# Patient Record
Sex: MALE | Race: WHITE | URBAN - METROPOLITAN AREA
[De-identification: names, ages, dates, MRNs, and addresses within clinical notes are randomized per-mention and may not be internally consistent; named-entity substitution may affect disease eponyms.]

---

## 2017-08-25 ENCOUNTER — OFFICE VISIT (OUTPATIENT)
Dept: FAMILY MEDICINE | Facility: OTHER | Age: 37
End: 2017-08-25
Payer: COMMERCIAL

## 2017-08-25 VITALS
HEIGHT: 69 IN | BODY MASS INDEX: 26.73 KG/M2 | WEIGHT: 180.5 LBS | DIASTOLIC BLOOD PRESSURE: 66 MMHG | TEMPERATURE: 97.8 F | HEART RATE: 64 BPM | RESPIRATION RATE: 16 BRPM | SYSTOLIC BLOOD PRESSURE: 126 MMHG

## 2017-08-25 DIAGNOSIS — L23.7 CONTACT DERMATITIS DUE TO POISON IVY: Primary | ICD-10-CM

## 2017-08-25 DIAGNOSIS — Z23 NEED FOR TDAP VACCINATION: ICD-10-CM

## 2017-08-25 PROCEDURE — 90471 IMMUNIZATION ADMIN: CPT | Performed by: NURSE PRACTITIONER

## 2017-08-25 PROCEDURE — 99213 OFFICE O/P EST LOW 20 MIN: CPT | Mod: 25 | Performed by: NURSE PRACTITIONER

## 2017-08-25 PROCEDURE — 90715 TDAP VACCINE 7 YRS/> IM: CPT | Performed by: NURSE PRACTITIONER

## 2017-08-25 RX ORDER — CLOBETASOL PROPIONATE 0.5 MG/G
CREAM TOPICAL
Qty: 45 G | Refills: 0 | Status: SHIPPED | OUTPATIENT
Start: 2017-08-25 | End: 2019-11-13

## 2017-08-25 RX ORDER — PREDNISONE 20 MG/1
TABLET ORAL
Qty: 15 TABLET | Refills: 0 | Status: SHIPPED | OUTPATIENT
Start: 2017-08-25 | End: 2019-11-13

## 2017-08-25 ASSESSMENT — PAIN SCALES - GENERAL: PAINLEVEL: NO PAIN (0)

## 2017-08-25 NOTE — MR AVS SNAPSHOT
After Visit Summary   8/25/2017    Parvez Courtney    MRN: [id number]           Patient Information     Date Of Birth          1980        Visit Information        Provider Department      8/25/2017 1:00 PM Anabell Ramesh NP [facility]        Today's Diagnoses     Contact dermatitis due to poison ivy    -  1    Need for Tdap vaccination          Care Instructions    Make sure to avoid the plants in the future  Wash all clothing and bedding you may have had contact with  Can use over the counter products for itch such as oatmeal baths, calmine lotion, zanfel  Could take benedryl 25-50mg at night to help you sleep  Can use the clobetasol ointment twice a day as needed for itch    Take the steroids as prescribed.  40mg a day for 3 days, then 30mg a day for 3 days, then 20mg a day for 3 days then 10mg a day for 3 days then stop.    Follow up if no improvement, fevers, signs of infection.    Follow up for physical when you have time          Follow-ups after your visit        Who to contact     If you have questions or need follow up information about today's clinic visit or your schedule please contact [facility] directly at [phone].  Normal or non-critical lab and imaging results will be communicated to you by MyChart, letter or phone within 4 business days after the clinic has received the results. If you do not hear from us within 7 days, please contact the clinic through MyChart or phone. If you have a critical or abnormal lab result, we will notify you by phone as soon as possible.  Submit refill requests through [employer] or call your pharmacy and they will forward the refill request to us. Please allow 3 business days for your refill to be completed.          Additional Information About Your Visit        MyChart Information     [employer] lets you send messages to your doctor, view your test results, renew your prescriptions, schedule appointments and more.  "To sign up, go to [email or web address] . Click on \"Log in\" on the left side of the screen, which will take you to the Welcome page. Then click on \"Sign up Now\" on the right side of the page.     You will be asked to enter the access code listed below, as well as some personal information. Please follow the directions to create your username and password.     Your access code is: [email or web address]  Expires: 2017  1:24 PM     Your access code will  in 90 days. If you need help or a new code, please call your [facility] clinic or [phone].        Care EveryWhere ID     This is your Care EveryWhere ID. This could be used by other organizations to access your [facility] medical records  [id number]        Your Vitals Were     Pulse Temperature Respirations Height BMI (Body Mass Index)       64 97.8  F (36.6  C) (Temporal) 16 5' 8.5\" (1.74 m) 27.04 kg/m2        Blood Pressure from Last 3 Encounters:   17 126/66   16 100/64   03/15/16 108/62    Weight from Last 3 Encounters:   17 180 lb 8 oz (81.9 kg)   16 175 lb (79.4 kg)   03/15/16 186 lb (84.4 kg)              We Performed the Following     TDAP VACCINE (ADACEL)          Today's Medication Changes          These changes are accurate as of: 17  1:24 PM.  If you have any questions, ask your nurse or doctor.               Start taking these medicines.        Dose/Directions    predniSONE 20 MG tablet   Commonly known as:  DELTASONE   Used for:  Contact dermatitis due to poison ivy   Started by:  Anabell Ramesh, NP        Take 2 tabs (40 mg) by mouth daily x 3 days, 1.5 tabs (30 mg) daily x 3 days, 1 tab (20 mg) daily x 3 days, then 1/2 tab (10 mg) x 3 days.   Quantity:  15 tablet   Refills:  0            Where to get your medicines      These medications were sent to [facility] - [address]  [address] [address]     Phone:  [phone]     predniSONE 20 MG tablet       "          Primary Care Provider Office Phone # Fax #    aDmir Hernandez [phone] [address] [place] [address]        Equal Access to Services     KOLEHIRA CYNTHIA : Bella joe carbajal franciso Soireneali, waaxda luqadaha, qaybta kaalmada idaliada, tonya rodrígueztory dyllan. So [facility] [phone].    ATENCIÓN: Si habla español, tiene a read disposición servicios gratuitos de asistencia lingüística. Llame al [phone].    We comply with applicable federal civil rights laws and [place] laws. We do not discriminate on the basis of race, color, national origin, age, disability sex, sexual orientation or gender identity.            Thank you!     Thank you for choosing [facility]  for your care. Our goal is always to provide you with excellent care. Hearing back from our patients is one way we can continue to improve our services. Please take a few minutes to complete the written survey that you may receive in the mail after your visit with us. Thank you!             Your Updated Medication List - Protect others around you: Learn how to safely use, store and throw away your medicines at www.disposemymeds.org.          This list is accurate as of: 8/25/17  1:24 PM.  Always use your most recent med list.                   Brand Name Dispense Instructions for use Diagnosis    predniSONE 20 MG tablet    DELTASONE    15 tablet    Take 2 tabs (40 mg) by mouth daily x 3 days, 1.5 tabs (30 mg) daily x 3 days, 1 tab (20 mg) daily x 3 days, then 1/2 tab (10 mg) x 3 days.    Contact dermatitis due to poison ivy

## 2017-08-25 NOTE — PATIENT INSTRUCTIONS
Make sure to avoid the plants in the future  Wash all clothing and bedding you may have had contact with  Can use over the counter products for itch such as oatmeal baths, calmine lotion, zanfel  Could take benedryl 25-50mg at night to help you sleep  Can use the clobetasol ointment twice a day as needed for itch    Take the steroids as prescribed.  40mg a day for 3 days, then 30mg a day for 3 days, then 20mg a day for 3 days then 10mg a day for 3 days then stop.    Follow up if no improvement, fevers, signs of infection.    Follow up for physical when you have time

## 2017-08-25 NOTE — NURSING NOTE
Prior to injection verified patient identity using patient's name and date of birth.  Screening Questionnaire for Adult Immunization    Are you sick today?   No   Do you have allergies to medications, food, a vaccine component or latex?   No   Have you ever had a serious reaction after receiving a vaccination?   No   Do you have a long-term health problem with heart disease, lung disease, asthma, kidney disease, metabolic disease (e.g. diabetes), anemia, or other blood disorder?   No   Do you have cancer, leukemia, HIV/AIDS, or any other immune system problem?   No   In the past 3 months, have you taken medications that affect  your immune system, such as prednisone, other steroids, or anticancer drugs; drugs for the treatment of rheumatoid arthritis, Crohn s disease, or psoriasis; or have you had radiation treatments?   No   Have you had a seizure, or a brain or other nervous system problem?   No   During the past year, have you received a transfusion of blood or blood     products, or been given immune (gamma) globulin or antiviral drug?   No   For women: Are you pregnant or is there a chance you could become        pregnant during the next month?   No   Have you received any vaccinations in the past 4 weeks?   No     Immunization questionnaire answers were all negative.        Per orders of Anabell Ramesh, injection of Tdap given by Crystal Solano. Patient instructed to remain in clinic for 15 minutes afterwards, and to report any adverse reaction to me immediately.       Screening performed by Crystal Solano on 8/25/2017 at 1:26 PM.

## 2017-08-25 NOTE — NURSING NOTE
"Chief Complaint   Patient presents with     Poison Ivy       Initial /66  Pulse 64  Temp 97.8  F (36.6  C) (Temporal)  Resp 16  Ht 5' 8.5\" (1.74 m)  Wt 180 lb 8 oz (81.9 kg)  BMI 27.04 kg/m2 Estimated body mass index is 27.04 kg/(m^2) as calculated from the following:    Height as of this encounter: 5' 8.5\" (1.74 m).    Weight as of this encounter: 180 lb 8 oz (81.9 kg).  Medication Reconciliation: complete    "

## 2017-08-25 NOTE — PROGRESS NOTES
SUBJECTIVE:   Parvez Courtney is a 36 year old male who presents to clinic today for the following health issues:    Rash  Onset: x 1 week    Description:   Location: Arms, and legs  Character: round, blotchy, raised, red  Itching (Pruritis): YES    Progression of Symptoms:  worsening    Accompanying Signs & Symptoms:  Fever: no   Body aches or joint pain: no   Sore throat symptoms: no   Recent cold symptoms: no     History:   Previous similar rash: YES    Precipitating factors:   Exposure to similar rash: no   New exposures: grasses   Recent travel: no     Alleviating factors:  no    Therapies Tried and outcome: Tecnu, poison ivy wash scrub, benadryl anti itch cream- nothing is helping    Gets poisen ivy often.  Has had third time this summer.  Put hand in it one time.  [place] has been trying to avoid the spots but hit a small patch again- has been spreading.      Problem list and histories reviewed & adjusted, as indicated.  Additional history: as documented    Patient Active Problem List   Diagnosis     Hyperlipidemia LDL goal <130     Umbilical hernia     Past Surgical History:   Procedure Laterality Date     HERNIORRHAPHY UMBILICAL  12/26/2013    Procedure: HERNIORRHAPHY UMBILICAL;  Open Umbilical hernia repair;  Surgeon: Jose Maria Rodriguez MD;  Location: [facility] OR     REPAIR TENDON BICEPS DISTAL UPPER EXTREMITY Right 6/15/2015    Procedure: REPAIR TENDON BICEPS DISTAL UPPER EXTREMITY;  Surgeon: Heron Lees DO;  Location: [facility] OR       Social History   Substance Use Topics     Smoking status: Former Smoker     Quit date: 1/26/2007     Smokeless tobacco: Current User     Types: Chew     Alcohol use No     Family History   Problem Relation Age of Onset     Other Cancer Brother 38     testicular     Other Cancer Paternal Grandfather      bladder     DIABETES No family hx of      Coronary Artery Disease No family hx of      Hypertension No family hx of       "Hyperlipidemia No family hx of      CEREBROVASCULAR DISEASE No family hx of      Breast Cancer No family hx of      Colon Cancer No family hx of      Prostate Cancer No family hx of          Current Outpatient Prescriptions   Medication Sig Dispense Refill     predniSONE (DELTASONE) 20 MG tablet Take 2 tabs (40 mg) by mouth daily x 3 days, 1.5 tabs (30 mg) daily x 3 days, 1 tab (20 mg) daily x 3 days, then 1/2 tab (10 mg) x 3 days. 15 tablet 0     clobetasol (TEMOVATE) 0.05 % cream Apply sparingly to affected area twice daily as needed.  Do not apply to face. 45 g 0     Allergies   Allergen Reactions     Nkda [No Known Drug Allergies]          Reviewed and updated as needed this visit by clinical staffTobacco  Allergies  Meds  Med Hx  Surg Hx  Fam Hx  Soc Hx      Reviewed and updated as needed this visit by Provider         ROS:  Constitutional, HEENT, cardiovascular, pulmonary, gi and gu systems are negative, except as otherwise noted.      OBJECTIVE:   /66  Pulse 64  Temp 97.8  F (36.6  C) (Temporal)  Resp 16  Ht 5' 8.5\" (1.74 m)  Wt 180 lb 8 oz (81.9 kg)  BMI 27.04 kg/m2  Body mass index is 27.04 kg/(m^2).   GENERAL: healthy, alert and no distress  MS: no gross musculoskeletal defects noted, no edema  SKIN: Skin: vesicles and erythematous papules in a linear arrangements on face, arms, legs and groin. No evidence for a secondary bacterial infection.     Diagnostic Test Results:  none     ASSESSMENT/PLAN:     Problem List Items Addressed This Visit     None      Visit Diagnoses    1 Contact dermatitis due to poison ivy    -  Primary  Discussed supportive cares- treat with oral steroids and cream.  Follow up if no improvement- sooner if concern.    Relevant Medications    predniSONE (DELTASONE) 20 MG tablet    clobetasol (TEMOVATE) 0.05 % cream   2 Need for Tdap vaccination        Relevant Orders    TDAP VACCINE (ADACEL) (Completed)         Advised to follow up for physical for Health Maintenance " and cholesterol check.      Anabell Ramesh NP  [facility]

## 2018-07-06 ENCOUNTER — TELEPHONE (OUTPATIENT)
Dept: FAMILY MEDICINE | Facility: OTHER | Age: 38
End: 2018-07-06

## 2018-12-31 ENCOUNTER — TELEPHONE (OUTPATIENT)
Dept: FAMILY MEDICINE | Facility: CLINIC | Age: 38
End: 2018-12-31

## 2018-12-31 NOTE — TELEPHONE ENCOUNTER
Reason for Call:  Other appointment    Detailed comments: pt thinks he has a sinus infection. Wondering about being worked in today. Please advise.     Phone Number Patient can be reached at: Home number on file [phone] (home)    Best Time: any     Can we leave a detailed message on this number? YES    Call taken on 12/31/2018 at 10:13 AM by Alina Poole

## 2018-12-31 NOTE — TELEPHONE ENCOUNTER
Parvez Courtney is a 38 year old male who calls with complaints of sinus problems.    NURSING ASSESSMENT:  Description:  3 weeks of stuffy nose during day, productive cough. Denies fever, or shortness of breath.  Onset/duration:  3 weeks off and on.  Precip. factors:  n/a  Associated symptoms:  Morning stuffy nose, with product cough. Reports feeling OK now as day has progressed.  Improves/worsens symptoms:  n/a  Pain scale (0-10)   0/10    Allergies:   Allergies   Allergen Reactions     Nkda [No Known Drug Allergies]        NURSING PLAN: Nursing advice to patient Patient reports he has televisit scheduled with provider today. Offered appointment last this week he reports he will be out of town.    RECOMMENDED DISPOSITION:  Home care advice - if not improving or unable to be seen by tele health OV after the first or urgent care.   Will comply with recommendation: Yes  If further questions/concerns or if symptoms do not improve, worsen or new symptoms develop, call your PCP or [facility] as soon as possible.      Guideline used: Congestion  Telephone Triage Protocols for Nurses, Fifth Edition, Kiersten Sheppard RN

## 2019-11-12 NOTE — PROGRESS NOTES
Subjective     Parvez Courtney is a 39 year old male who presents to clinic today for the following health issues:    HPI   Patient has a sore mass on his left abdominal wall. It was a little uncomfortable yesterday but he was poking at it quite a bit. It feels better today. Patient has a history of hernias. He noticed it about a year ago.       Patient Active Problem List   Diagnosis     Hyperlipidemia LDL goal <130     Umbilical hernia     Past Surgical History:   Procedure Laterality Date     HERNIORRHAPHY UMBILICAL  2013    Procedure: HERNIORRHAPHY UMBILICAL;  Open Umbilical hernia repair;  Surgeon: Jose Maria Rodriguez MD;  Location: [facility]     REPAIR TENDON BICEPS DISTAL UPPER EXTREMITY Right 6/15/2015    Procedure: REPAIR TENDON BICEPS DISTAL UPPER EXTREMITY;  Surgeon: Heron Lees DO;  Location: [facility] OR       Social History     Tobacco Use     Smoking status: Former Smoker     Last attempt to quit: 2007     Years since quittin.8     Smokeless tobacco: Current User     Types: Chew   Substance Use Topics     Alcohol use: No     Alcohol/week: 0.0 standard drinks     Family History   Problem Relation Age of Onset     Other Cancer Brother 38        testicular     Other Cancer Paternal Grandfather         bladder     Diabetes No family hx of      Coronary Artery Disease No family hx of      Hypertension No family hx of      Hyperlipidemia No family hx of      Cerebrovascular Disease No family hx of      Breast Cancer No family hx of      Colon Cancer No family hx of      Prostate Cancer No family hx of          No current outpatient medications on file.     Allergies   Allergen Reactions     Nkda [No Known Drug Allergies]      BP Readings from Last 3 Encounters:   19 112/70   17 126/66   16 100/64    Wt Readings from Last 3 Encounters:   19 82.7 kg (182 lb 4.8 oz)   17 81.9 kg (180 lb 8 oz)   16 79.4 kg (175 lb)                    Reviewed and updated as  "needed this visit by Provider         Review of Systems   ROS COMP: Constitutional, HEENT, cardiovascular, pulmonary, gi and gu systems are negative, except as otherwise noted.      Objective    /70   Pulse 74   Temp 98.6  F (37  C) (Temporal)   Resp 16   Ht 1.723 m (5' 7.82\")   Wt 82.7 kg (182 lb 4.8 oz)   SpO2 97%   BMI 27.87 kg/m    Body mass index is 27.87 kg/m .  Physical Exam   GENERAL: healthy, alert and no distress  ABDOMEN: soft, nontender, without hepatosplenomegaly or masses and approximately 1 cm oval, rubbery, mobile mass in subcutaneous tissue in LLQ  SKIN: no suspicious lesions or rashes  NEURO: Normal strength and tone, mentation intact and speech normal  PSYCH: mentation appears normal, affect normal/bright    Diagnostic Test Results:  Labs reviewed in Epic        Assessment & Plan     1. Lipoma of skin and subcutaneous tissue  Reassured that this feels like a lipoma and is benign. Gave him written information on lipomas.     2. Screening for diabetes mellitus  - **Basic metabolic panel FUTURE anytime; Future    3. Lipid screening  - Lipid panel reflex to direct LDL Fasting; Future     BMI:   Estimated body mass index is 27.87 kg/m  as calculated from the following:    Height as of this encounter: 1.723 m (5' 7.82\").    Weight as of this encounter: 82.7 kg (182 lb 4.8 oz).   Weight management plan: Discussed healthy diet and exercise guidelines    No follow-ups on file.    HAFSA [facility]    "

## 2019-11-13 ENCOUNTER — OFFICE VISIT (OUTPATIENT)
Dept: FAMILY MEDICINE | Facility: OTHER | Age: 39
End: 2019-11-13
Payer: COMMERCIAL

## 2019-11-13 VITALS
RESPIRATION RATE: 16 BRPM | HEIGHT: 68 IN | WEIGHT: 182.3 LBS | BODY MASS INDEX: 27.63 KG/M2 | HEART RATE: 74 BPM | SYSTOLIC BLOOD PRESSURE: 112 MMHG | DIASTOLIC BLOOD PRESSURE: 70 MMHG | OXYGEN SATURATION: 97 % | TEMPERATURE: 98.6 F

## 2019-11-13 DIAGNOSIS — Z13.1 SCREENING FOR DIABETES MELLITUS: ICD-10-CM

## 2019-11-13 DIAGNOSIS — Z13.220 LIPID SCREENING: ICD-10-CM

## 2019-11-13 DIAGNOSIS — D17.30 LIPOMA OF SKIN AND SUBCUTANEOUS TISSUE: Primary | ICD-10-CM

## 2019-11-13 PROCEDURE — 99213 OFFICE O/P EST LOW 20 MIN: CPT | Performed by: STUDENT IN AN ORGANIZED HEALTH CARE EDUCATION/TRAINING PROGRAM

## 2019-11-13 ASSESSMENT — MIFFLIN-ST. JEOR: SCORE: 1708.47

## 2019-11-13 NOTE — PATIENT INSTRUCTIONS
Fasting labs - fast for 9-12 hours prior to the lab visit. You can drink water or black coffee while fasting.    Lidya Kathleen, LEE-C    Patient Education     Understanding a Lipoma    A lipoma is a benign lump under the skin that s made of fat. It s not cancer. It feels soft like rubber when you press it, and in most cases it doesn t hurt. Some people have more than one. A lipoma grows slowly over time and doesn t cause many problems. Lipomas occur most often in adults from ages 40 to 60, and more often in men.  How to say it  [id number]-tonya   What causes a lipoma?  The cause is not yet known. Experts are still learning more. It may be partly caused by a problem in a gene. They can run in families. Familial multiple lipomatosis is when 2 or more family members have many lipomas.  Symptoms of a lipoma  The main symptom of a lipoma is a soft lump under the skin that doesn t hurt unless it is pressing on a nerve. It may be small, around 1/4 inch across. Or it may be larger, up to 4 inches across or more.  There are different kinds of lipomas. The most common kind occurs under the skin of the shoulders, chest, back, belly, or under the arms. In some cases, a lipoma can occur on the legs. In rare cases, one may occur deeper in the body or in a muscle.  Treatment for a lipoma  In most cases, a lipoma doesn t need treatment. Your healthcare provider may look at it during regular checkups to see if it changes.  But if the lipoma is painful or you want it removed for cosmetic reasons, it can be removed with surgery. The surgery is called excision. The lipoma will most likely not grow back after surgery. During surgery, the area around the lipoma is numbed. If you have a deep lipoma, you may need medicine called regional anesthesia to numb a larger area. Or you may need medicine called general anesthesia to put you to sleep during the procedure. Then the doctor makes a cut over the area of the lipoma. He or she removes the  lump of fat. The cut is then closed with stitches.  Possible complications of a lipoma  A large lipoma inside the body can press on organs, nerves, or other tissues and cause problems. For example, it can cause problems with breathing or digestion.  Living with a lipoma  Your healthcare provider may look at the lipoma during regular checkups to see if it changes or is causing problems.  When to call your healthcare provider  Call your healthcare provider right away if you have any of these:    Lipoma that grows quickly, causes pain, or feels hard    Growth of new lipomas   Date Last Reviewed: 5/1/2016 2000-2018 [employer]. [address] [place] [address], [place] [id number]. All rights reserved. This information is not intended as a substitute for professional medical care. Always follow your healthcare professional's instructions.

## 2020-11-24 ENCOUNTER — TELEPHONE (OUTPATIENT)
Dept: FAMILY MEDICINE | Facility: CLINIC | Age: 40
End: 2020-11-24

## 2020-11-24 NOTE — TELEPHONE ENCOUNTER
Summary:    Patient is due/failing the following:   Physical and LDL    Action needed:   Patient needs office visit for Physical. and Patient needs fasting lab only appointment    Type of outreach:    Phone, left message for patient to call back.     Questions for provider review:    None                                                                                                                                    Deedee Dominique CMA       Chart routed to Care Team .

## 2020-12-22 NOTE — TELEPHONE ENCOUNTER
Left message for patient to call clinic. Please assist with scheduling Physical.    Deedee Dominique [facility]

## 2022-02-17 NOTE — TELEPHONE ENCOUNTER
LM for pt to return call, when call is returned give information below and also let him know that Adonis can do the referral for a place that does the evaluation at the appt if he still wants to be seen today by him or rescheduled with a different provider.    Zakiya Taylor CMA ([facility])    
Pt informed and canceled appt.    Zakiya Taylor CMA ([facility])    
Pt is scheduled to see Adonis Pedraza today for add/adhd however does not have records from counseling(Not a family practice provider) for evaluation for this and does not do the evaluation himself. Provider will not prescribe meds with out this being done.    Zakiya Taylor CMA ([facility])    
Alert and oriented to person, place and time

## 2022-03-06 ENCOUNTER — HEALTH MAINTENANCE LETTER (OUTPATIENT)
Age: 42
End: 2022-03-06

## 2022-11-21 ENCOUNTER — HEALTH MAINTENANCE LETTER (OUTPATIENT)
Age: 42
End: 2022-11-21

## 2023-04-16 ENCOUNTER — HEALTH MAINTENANCE LETTER (OUTPATIENT)
Age: 43
End: 2023-04-16

## 2024-05-23 ENCOUNTER — OFFICE VISIT (OUTPATIENT)
Dept: FAMILY MEDICINE | Facility: OTHER | Age: 44
End: 2024-05-23
Payer: COMMERCIAL

## 2024-05-23 VITALS
HEIGHT: 68 IN | RESPIRATION RATE: 20 BRPM | WEIGHT: 190 LBS | TEMPERATURE: 98.3 F | OXYGEN SATURATION: 95 % | BODY MASS INDEX: 28.79 KG/M2 | SYSTOLIC BLOOD PRESSURE: 108 MMHG | HEART RATE: 70 BPM | DIASTOLIC BLOOD PRESSURE: 70 MMHG

## 2024-05-23 DIAGNOSIS — Z00.00 ROUTINE HISTORY AND PHYSICAL EXAMINATION OF ADULT: ICD-10-CM

## 2024-05-23 DIAGNOSIS — R06.09 POST-COVID CHRONIC DYSPNEA: ICD-10-CM

## 2024-05-23 DIAGNOSIS — U09.9 POST-COVID CHRONIC DYSPNEA: ICD-10-CM

## 2024-05-23 DIAGNOSIS — Z83.719 FAMILY HISTORY OF COLONIC POLYPS: ICD-10-CM

## 2024-05-23 DIAGNOSIS — Z12.11 SPECIAL SCREENING FOR MALIGNANT NEOPLASMS, COLON: ICD-10-CM

## 2024-05-23 DIAGNOSIS — E78.5 HYPERLIPIDEMIA LDL GOAL <130: Primary | ICD-10-CM

## 2024-05-23 DIAGNOSIS — B35.1 ONYCHOMYCOSIS: ICD-10-CM

## 2024-05-23 DIAGNOSIS — M65.331 TRIGGER MIDDLE FINGER OF RIGHT HAND: ICD-10-CM

## 2024-05-23 PROCEDURE — 99213 OFFICE O/P EST LOW 20 MIN: CPT | Mod: 25 | Performed by: PHYSICIAN ASSISTANT

## 2024-05-23 PROCEDURE — 99386 PREV VISIT NEW AGE 40-64: CPT | Performed by: PHYSICIAN ASSISTANT

## 2024-05-23 SDOH — HEALTH STABILITY: PHYSICAL HEALTH: ON AVERAGE, HOW MANY DAYS PER WEEK DO YOU ENGAGE IN MODERATE TO STRENUOUS EXERCISE (LIKE A BRISK WALK)?: 7 DAYS

## 2024-05-23 ASSESSMENT — PAIN SCALES - GENERAL: PAINLEVEL: NO PAIN (0)

## 2024-05-23 ASSESSMENT — SOCIAL DETERMINANTS OF HEALTH (SDOH): HOW OFTEN DO YOU GET TOGETHER WITH FRIENDS OR RELATIVES?: ONCE A WEEK

## 2024-05-23 NOTE — PROGRESS NOTES
"Preventive Care Visit  [facility]  Adonis Ashton PA-C, Family Medicine  May 23, 2024      Assessment & Plan     Routine history and physical examination of adult  43-year-old male here for routine physical.  Labs pending.  Follow-up based on results.  - CBC with platelets; Future  - Comprehensive metabolic panel (BMP + Alb, Alk Phos, ALT, AST, Total. Bili, TP); Future  - Lipid panel reflex to direct LDL Fasting; Future  - PSA, screen; Future    Hyperlipidemia LDL goal <130  LDL goal established.  Labs pending.  Follow-up based on results.    Post-COVID chronic dyspnea  Patient has remote history of tobacco use about a pack a day for 10 to 15 years.  He quit about 18 years ago.   he also has a history of having COVID-19 and has been a little bit more short of breath since that point in time.  We did discuss the potential of emphysema and chronic obstructive pulmonary disease from tobacco use as well as chronic shortness of breath from post-COVID syndrome.  Follow-up as needed.    Trigger middle finger of right hand  Describes some exercises that he can do to try to lessen the severity of his trigger finger of the middle finger of the right hand.  Follow-up with occupational therapy and hand specialty as needed.    Onychomycosis  Advised nail care and discussed approach to this.  He would like to continue to working on this on his own.    Special screening for malignant neoplasms, colon  Family history of colonic polyps  Colonoscopy pending.  No other signs and symptoms.    Patient has been advised of split billing requirements and indicates understanding: Yes    BMI  Estimated body mass index is 28.96 kg/m  as calculated from the following:    Height as of this encounter: 1.725 m (5' 7.91\").    Weight as of this encounter: 86.2 kg (190 lb).   Weight management plan: Discussed healthy diet and exercise guidelines    Counseling  Appropriate preventive services were discussed with this patient, " including applicable screening as appropriate for fall prevention, nutrition, physical activity, Tobacco-use cessation, weight loss and cognition.  Checklist reviewing preventive services available has been given to the patient.  Reviewed patient's diet, addressing concerns and/or questions.   The patient was instructed to see the dentist every 6 months.   He is at risk for psychosocial distress and has been provided with information to reduce risk.       Work on weight loss  Regular exercise    Oneal Hannon is a 43 year old, presenting for the following:  Physical        5/23/2024     2:54 PM   Additional Questions   Roomed by Damaris   Accompanied by Self         5/23/2024     2:54 PM   Patient Reported Additional Medications   Patient reports taking the following new medications NA        Health Care Directive  Patient does not have a Health Care Directive or Living Will: Discussed advance care planning with patient; however, patient declined at this time.    HPI        5/23/2024   General Health   How would you rate your overall physical health? Good   Feel stress (tense, anxious, or unable to sleep) Only a little   (!) STRESS CONCERN      5/23/2024   Nutrition   Three or more servings of calcium each day? Yes   Diet: I don't know   How many servings of fruit and vegetables per day? (!) 2-3   How many sweetened beverages each day? 0-1         5/23/2024   Exercise   Days per week of moderate/strenous exercise 7 days         5/23/2024   Social Factors   Frequency of gathering with friends or relatives Once a week   Worry food won't last until get money to buy more No   Food not last or not have enough money for food? No   Do you have housing?  Yes   Are you worried about losing your housing? No   Lack of transportation? No   Unable to get utilities (heat,electricity)? No         5/23/2024   Dental   Dentist two times every year? (!) NO         5/23/2024   TB Screening   Were you born outside of the [place]? No          Today's PHQ-2 Score:       5/23/2024     2:38 PM   PHQ-2 ( 1999 [employer])   Q1: Little interest or pleasure in doing things 0   Q2: Feeling down, depressed or hopeless 0   PHQ-2 Score 0   Q1: Little interest or pleasure in doing things Not at all   Q2: Feeling down, depressed or hopeless Not at all   PHQ-2 Score 0           5/23/2024   Substance Use   Alcohol more than 3/day or more than 7/wk No   Do you use any other substances recreationally? No     Social History     Tobacco Use    Smoking status: Former    Smokeless tobacco: Current     Types: Chew   Vaping Use    Vaping status: Never Used   Substance Use Topics    Alcohol use: No     Alcohol/week: 0.0 standard drinks of alcohol    Drug use: No           5/23/2024   STI Screening   New sexual partner(s) since last STI/HIV test? No   ASCVD Risk   The ASCVD Risk score (Ting OSUNA, et al., 2019) failed to calculate for the following reasons:    Cannot find a previous HDL lab    Cannot find a previous total cholesterol lab    The BP treatment status is invalid        5/23/2024   Contraception/Family Planning   Questions about contraception or family planning No        Reviewed and updated as needed this visit by Provider    Allergies  Meds  Problems               No past medical history on file.  Past Surgical History:   Procedure Laterality Date    HERNIORRHAPHY UMBILICAL  12/26/2013    Procedure: HERNIORRHAPHY UMBILICAL;  Open Umbilical hernia repair;  Surgeon: Jose Maria Rodriguez MD;  Location: [facility] OR    REPAIR TENDON BICEPS DISTAL UPPER EXTREMITY Right 6/15/2015    Procedure: REPAIR TENDON BICEPS DISTAL UPPER EXTREMITY;  Surgeon: Heron Lees DO;  Location: [facility] OR     Lab work is in process  Labs reviewed in EPIC  BP Readings from Last 3 Encounters:   05/23/24 108/70   11/13/19 112/70   08/25/17 126/66    Wt Readings from Last 3 Encounters:   05/23/24 86.2 kg (190 lb)   11/13/19 82.7 kg (182 lb 4.8 oz)   08/25/17 81.9 kg (180 lb 8 oz)  "                 Patient Active Problem List   Diagnosis    Hyperlipidemia LDL goal <130    Umbilical hernia    Family history of colonic polyps     Past Surgical History:   Procedure Laterality Date    HERNIORRHAPHY UMBILICAL  12/26/2013    Procedure: HERNIORRHAPHY UMBILICAL;  Open Umbilical hernia repair;  Surgeon: Jose Maria Rodriguez MD;  Location: [facility] OR    REPAIR TENDON BICEPS DISTAL UPPER EXTREMITY Right 6/15/2015    Procedure: REPAIR TENDON BICEPS DISTAL UPPER EXTREMITY;  Surgeon: Heron Lees DO;  Location: [facility] OR       Social History     Tobacco Use    Smoking status: Former    Smokeless tobacco: Current     Types: Chew   Substance Use Topics    Alcohol use: No     Alcohol/week: 0.0 standard drinks of alcohol     Family History   Problem Relation Age of Onset    Other Cancer Brother 38        testicular    Other Cancer Paternal Grandfather         bladder    Diabetes No family hx of     Coronary Artery Disease No family hx of     Hypertension No family hx of     Hyperlipidemia No family hx of     Cerebrovascular Disease No family hx of     Breast Cancer No family hx of     Colon Cancer No family hx of     Prostate Cancer No family hx of          No current outpatient medications on file.     Allergies   Allergen Reactions    Nkda [No Known Drug Allergy]      No lab results found.       Review of Systems  Constitutional, HEENT, cardiovascular, pulmonary, GI, , musculoskeletal, neuro, skin, endocrine and psych systems are negative, except as otherwise noted.     Objective    Exam  /70   Pulse 70   Temp 98.3  F (36.8  C) (Temporal)   Resp 20   Ht 1.725 m (5' 7.91\")   Wt 86.2 kg (190 lb)   SpO2 95%   BMI 28.96 kg/m     Estimated body mass index is 28.96 kg/m  as calculated from the following:    Height as of this encounter: 1.725 m (5' 7.91\").    Weight as of this encounter: 86.2 kg (190 lb).    Physical Exam  GENERAL: alert and no distress  EYES: Eyes grossly normal to inspection, PERRL " and conjunctivae and sclerae normal  HENT: ear canals and TM's normal, nose and mouth without ulcers or lesions  NECK: no adenopathy, no asymmetry, masses, or scars  RESP: lungs clear to auscultation - no rales, rhonchi or wheezes  CV: regular rate and rhythm, normal S1 S2, no S3 or S4, no murmur, click or rub, no peripheral edema  ABDOMEN: soft, nontender, no hepatosplenomegaly, no masses and bowel sounds normal  MS: no gross musculoskeletal defects noted, no edema  SKIN: no suspicious lesions or rashes  NEURO: Normal strength and tone, mentation intact and speech normal  PSYCH: mentation appears normal, affect normal/bright        Signed Electronically by: Adonis Ashton PA-C

## 2025-04-23 ENCOUNTER — PATIENT OUTREACH (OUTPATIENT)
Dept: CARE COORDINATION | Facility: CLINIC | Age: 45
End: 2025-04-23
Payer: COMMERCIAL

## 2025-05-07 ENCOUNTER — PATIENT OUTREACH (OUTPATIENT)
Dept: CARE COORDINATION | Facility: CLINIC | Age: 45
End: 2025-05-07
Payer: COMMERCIAL

## 2025-07-12 ENCOUNTER — HEALTH MAINTENANCE LETTER (OUTPATIENT)
Age: 45
End: 2025-07-12